# Patient Record
Sex: FEMALE | Race: WHITE
[De-identification: names, ages, dates, MRNs, and addresses within clinical notes are randomized per-mention and may not be internally consistent; named-entity substitution may affect disease eponyms.]

---

## 2020-03-19 ENCOUNTER — HOSPITAL ENCOUNTER (EMERGENCY)
Dept: HOSPITAL 75 - ER FS | Age: 20
Discharge: HOME | End: 2020-03-19
Payer: COMMERCIAL

## 2020-03-19 VITALS — BODY MASS INDEX: 27.62 KG/M2 | WEIGHT: 165.79 LBS | HEIGHT: 65 IN

## 2020-03-19 VITALS — SYSTOLIC BLOOD PRESSURE: 125 MMHG | DIASTOLIC BLOOD PRESSURE: 74 MMHG

## 2020-03-19 DIAGNOSIS — A08.4: Primary | ICD-10-CM

## 2020-03-19 LAB
ALBUMIN SERPL-MCNC: 4.4 GM/DL (ref 3.2–4.5)
ALP SERPL-CCNC: 98 U/L (ref 40–136)
ALT SERPL-CCNC: 14 U/L (ref 0–55)
APTT PPP: YELLOW S
BASOPHILS # BLD AUTO: 0.1 10^3/UL (ref 0–0.1)
BASOPHILS NFR BLD AUTO: 0 % (ref 0–10)
BASOPHILS NFR BLD MANUAL: 0 %
BILIRUB SERPL-MCNC: 1 MG/DL (ref 0.1–1)
BILIRUB UR QL STRIP: NEGATIVE
BUN/CREAT SERPL: 18
CALCIUM SERPL-MCNC: 9.2 MG/DL (ref 8.5–10.1)
CHLORIDE SERPL-SCNC: 103 MMOL/L (ref 98–107)
CO2 SERPL-SCNC: 18 MMOL/L (ref 21–32)
CREAT SERPL-MCNC: 0.66 MG/DL (ref 0.6–1.3)
EOSINOPHIL # BLD AUTO: 0 10^3/UL (ref 0–0.3)
EOSINOPHIL NFR BLD AUTO: 0 % (ref 0–10)
EOSINOPHIL NFR BLD MANUAL: 0 %
ERYTHROCYTE [DISTWIDTH] IN BLOOD BY AUTOMATED COUNT: 12.9 % (ref 10–14.5)
FIBRINOGEN PPP-MCNC: (no result) MG/DL
GFR SERPLBLD BASED ON 1.73 SQ M-ARVRAT: > 60 ML/MIN
GLUCOSE SERPL-MCNC: 124 MG/DL (ref 70–105)
GLUCOSE UR STRIP-MCNC: NEGATIVE MG/DL
HCT VFR BLD CALC: 47 % (ref 35–52)
HGB BLD-MCNC: 15.8 G/DL (ref 11.5–16)
KETONES UR QL STRIP: (no result)
LEUKOCYTE ESTERASE UR QL STRIP: (no result)
LIPASE SERPL-CCNC: 14 U/L (ref 8–78)
LYMPHOCYTES # BLD AUTO: 0.6 X 10^3 (ref 1–4)
LYMPHOCYTES NFR BLD AUTO: 4 % (ref 12–44)
MANUAL DIFFERENTIAL PERFORMED BLD QL: YES
MCH RBC QN AUTO: 29 PG (ref 25–34)
MCHC RBC AUTO-ENTMCNC: 34 G/DL (ref 32–36)
MCV RBC AUTO: 85 FL (ref 80–99)
METAMYELOCYTES NFR BLD: 1 %
MONOCYTES # BLD AUTO: 1 X 10^3 (ref 0–1)
MONOCYTES NFR BLD AUTO: 6 % (ref 0–12)
MONOCYTES NFR BLD: 3 %
MYELOCYTES NFR BLD: 1 %
NEUTROPHILS # BLD AUTO: 15.3 X 10^3 (ref 1.8–7.8)
NEUTROPHILS NFR BLD AUTO: 90 % (ref 42–75)
NEUTS BAND NFR BLD MANUAL: 78 %
NEUTS BAND NFR BLD: 11 %
NITRITE UR QL STRIP: NEGATIVE
PH UR STRIP: 5.5 [PH] (ref 5–9)
PLATELET # BLD: 305 10^3/UL (ref 130–400)
PMV BLD AUTO: 10.2 FL (ref 7.4–10.4)
POTASSIUM SERPL-SCNC: 4.3 MMOL/L (ref 3.6–5)
PROT SERPL-MCNC: 7.7 GM/DL (ref 6.4–8.2)
PROT UR QL STRIP: NEGATIVE
RBC #/AREA URNS HPF: (no result) /HPF
SODIUM SERPL-SCNC: 137 MMOL/L (ref 135–145)
SP GR UR STRIP: >=1.03 (ref 1.02–1.02)
SQUAMOUS #/AREA URNS HPF: (no result) /HPF
VARIANT LYMPHS NFR BLD MANUAL: 6 %
WBC # BLD AUTO: 17 10^3/UL (ref 4.3–11)
WBC #/AREA URNS HPF: (no result) /HPF

## 2020-03-19 PROCEDURE — 36415 COLL VENOUS BLD VENIPUNCTURE: CPT

## 2020-03-19 PROCEDURE — 87088 URINE BACTERIA CULTURE: CPT

## 2020-03-19 PROCEDURE — 83690 ASSAY OF LIPASE: CPT

## 2020-03-19 PROCEDURE — 84703 CHORIONIC GONADOTROPIN ASSAY: CPT

## 2020-03-19 PROCEDURE — 85007 BL SMEAR W/DIFF WBC COUNT: CPT

## 2020-03-19 PROCEDURE — 80053 COMPREHEN METABOLIC PANEL: CPT

## 2020-03-19 PROCEDURE — 85027 COMPLETE CBC AUTOMATED: CPT

## 2020-03-19 PROCEDURE — 81000 URINALYSIS NONAUTO W/SCOPE: CPT

## 2020-03-19 PROCEDURE — 74022 RADEX COMPL AQT ABD SERIES: CPT

## 2020-03-19 NOTE — ED GU-FEMALE
General


Chief Complaint:  Abdominal/GI Problems


Stated Complaint:  BACK PAIN,VOMITING


Nursing Triage Note:  


pt states back pain with n/v/d started around 1930 tonight, pt has not taken 


anything for symptoms


Nursing Sepsis Screen:  No Definite Risk


Source:  patient


Exam Limitations:  no limitations





History of Present Illness


Date Seen by Provider:  Mar 19, 2020


Time Seen by Provider:  04:35


Initial Comments


19-year-old female presents with nausea, vomiting, diarrhea started around 7:30 

PM last night. Patient also complains of diffuse abdomen pain but worse in the 

epigastric and lower part of her abdomen. With severe pain in her back. She does

not have any cough or fever. He does not have any dysuria. She does not believe 

she is pregnant because she has an IUD.





Allergies and Home Medications


Allergies


Coded Allergies:  


     No Known Drug Allergies (Unverified , 3/19/20)





Patient Home Medication List


Home Medication List Reviewed:  Yes





Review of Systems


Review of Systems


Constitutional:  chills, fever


Respiratory:  No cough, No short of breath


Cardiovascular:  No chest pain


Gastrointestinal:  abdominal pain, diarrhea, nausea, vomiting


Genitourinary:  no symptoms reported


Musculoskeletal:  back pain


Skin:  no symptoms reported


Psychiatric/Neurological:  No Symptoms Reported


Endocrine:  No Symptoms Reported





Past Medical-Social-Family Hx


Past Med/Social Hx:  Reviewed Nursing Past Med/Soc Hx


Patient Social History


Alcohol Use:  Denies Use


Recreational Drug Use:  No


Smoking Status:  Never a Smoker


2nd Hand Smoke Exposure:  No


Recent Foreign Travel:  No


Contact w/Someone Who Travel:  No


Recent Infectious Disease Expo:  No


Recent Hopitalizations:  No


Physical Abuse:  No


Sexual Abuse:  No


Mistreated:  No


Fear:  No





Seasonal Allergies


Seasonal Allergies:  No





Past Medical History


Surgeries:  No


Respiratory:  No


Cardiac:  No


Neurological:  No


Genitourinary:  No


Gastrointestinal:  No


Musculoskeletal:  No


Endocrine:  No


HEENT:  No


Cancer:  No


Psychosocial:  No


Integumentary:  No


Blood Disorders:  No





Physical Exam


Vital Signs





Vital Signs - First Documented








 3/19/20





 04:29


 


Temp 36.6


 


Pulse 117


 


Resp 18


 


B/P (MAP) 117/71 (86)


 


Pulse Ox 100


 


O2 Delivery Room Air





Capillary Refill : Less Than 3 Seconds


Height, Weight, BMI


Height: '"


Weight: lbs. oz. kg; 27.00 BMI


Method:


General Appearance:  mild distress


Cardiovascular:  regular rate, rhythm


Respiratory:  lungs clear, normal breath sounds


Gastrointestinal:  No guarding, No rebound; tenderness (diffuse but slightly 

worse upper and lower)


Back:  other (mid back pain)


Extremities:  normal range of motion, non-tender


Neurologic/Psychiatric:  CNs II-XII nml as tested, alert, normal mood/affect, 

oriented x 3


Skin:  normal color, warm/dry





Progress/Results/Core Measures


Suspected Sepsis


Recent Fever Within 48 Hours:  No


Infection Criteria Present:  None


New/Unexplained  Altered Menta:  No


Sepsis Screen:  No Definite Risk


SIRS


Temperature: 


Pulse: 117 


Respiratory Rate: 18


 


Laboratory Tests


3/19/20 04:50: White Blood Count 17.0H


Blood Pressure 117 /71 


Mean: 86


 





Laboratory Tests


3/19/20 04:50: 


Creatinine 0.66, Platelet Count 305, Total Bilirubin 1.0








Results/Orders


Lab Results





Laboratory Tests








Test


 3/19/20


04:30 3/19/20


04:50 Range/Units


 


 


Urine Color YELLOW    


 


Urine Clarity CLOUDY    


 


Urine pH 5.5   5-9  


 


Urine Specific Gravity >=1.030   1.016-1.022  


 


Urine Protein NEGATIVE   NEGATIVE  


 


Urine Glucose (UA) NEGATIVE   NEGATIVE  


 


Urine Ketones 1+ H  NEGATIVE  


 


Urine Nitrite NEGATIVE   NEGATIVE  


 


Urine Bilirubin NEGATIVE   NEGATIVE  


 


Urine Urobilinogen 0.2   < = 1.0  MG/DL


 


Urine Leukocyte Esterase TRACE H  NEGATIVE  


 


Urine RBC (Auto) TRACE H  NEGATIVE  


 


Urine RBC NONE    /HPF


 


Urine WBC RARE    /HPF


 


Urine Squamous Epithelial


Cells RARE 


 


  /HPF





 


Urine Crystals NONE    /LPF


 


Urine Bacteria NA    /HPF


 


Urine Casts NONE    /LPF


 


Urine Mucus NONE    /LPF


 


Urine Culture Indicated YES    


 


White Blood Count


 


 17.0 H


 4.3-11.0


10^3/uL


 


Red Blood Count


 


 5.49 


 4.35-5.85


10^6/uL


 


Hemoglobin  15.8  11.5-16.0  G/DL


 


Hematocrit  47  35-52  %


 


Mean Corpuscular Volume  85  80-99  FL


 


Mean Corpuscular Hemoglobin  29  25-34  PG


 


Mean Corpuscular Hemoglobin


Concent 


 34 


 32-36  G/DL





 


Red Cell Distribution Width  12.9  10.0-14.5  %


 


Platelet Count


 


 305 


 130-400


10^3/uL


 


Mean Platelet Volume  10.2  7.4-10.4  FL


 


Neutrophils (%) (Auto)  90 H 42-75  %


 


Lymphocytes (%) (Auto)  4 L 12-44  %


 


Monocytes (%) (Auto)  6  0-12  %


 


Eosinophils (%) (Auto)  0  0-10  %


 


Basophils (%) (Auto)  0  0-10  %


 


Neutrophils # (Auto)  15.3 H 1.8-7.8  X 10^3


 


Lymphocytes # (Auto)  0.6 L 1.0-4.0  X 10^3


 


Monocytes # (Auto)  1.0  0.0-1.0  X 10^3


 


Eosinophils # (Auto)


 


 0.0 


 0.0-0.3


10^3/uL


 


Basophils # (Auto)


 


 0.1 


 0.0-0.1


10^3/uL


 


Neutrophils % (Manual)  78   %


 


Lymphocytes % (Manual)  6   %


 


Monocytes % (Manual)  3   %


 


Eosinophils % (Manual)  0   %


 


Basophils % (Manual)  0   %


 


Metamyelocytes %  1   %


 


Myelocytes %  1   %


 


Band Neutrophils  11   %


 


Sodium Level  137  135-145  MMOL/L


 


Potassium Level  4.3  3.6-5.0  MMOL/L


 


Chloride Level  103    MMOL/L


 


Carbon Dioxide Level  18 L 21-32  MMOL/L


 


Anion Gap  16 H 5-14  MMOL/L


 


Blood Urea Nitrogen  12  7-18  MG/DL


 


Creatinine


 


 0.66 


 0.60-1.30


MG/DL


 


Estimat Glomerular Filtration


Rate 


 > 60 


  





 


BUN/Creatinine Ratio  18   


 


Glucose Level  124 H   MG/DL


 


Calcium Level  9.2  8.5-10.1  MG/DL


 


Corrected Calcium  8.9  8.5-10.1  MG/DL


 


Total Bilirubin  1.0  0.1-1.0  MG/DL


 


Aspartate Amino Transf


(AST/SGOT) 


 15 


 5-34  U/L





 


Alanine Aminotransferase


(ALT/SGPT) 


 14 


 0-55  U/L





 


Alkaline Phosphatase  98    U/L


 


Total Protein  7.7  6.4-8.2  GM/DL


 


Albumin  4.4  3.2-4.5  GM/DL


 


Lipase  14  8-78  U/L


 


Serum Pregnancy Test,


Qualitative 


 NEGATIVE 


 NEGATIVE  











My Orders





Orders - SCHAEFFER,ESVIN L DO


Cbc With Automated Diff (3/19/20 04:40)


Comprehensive Metabolic Panel (3/19/20 04:40)


Hcg,Qualitative Serum (3/19/20 04:40)


Lipase (3/19/20 04:40)


Ua Culture If Indicated (3/19/20 04:40)


Ed Iv/Invasive Line Start (3/19/20 04:40)


Ondansetron Injection (Zofran Injectio (3/19/20 04:45)


Ns Iv 1000 Ml (Sodium Chloride 0.9%) (3/19/20 04:40)


Ed Iv/Invasive Line Start (3/19/20 04:40)


Acute Abd Series (3/19/20 04:40)


Ketorolac Injection (Toradol Injection) (3/19/20 04:40)


Manual Differential (3/19/20 04:50)


Urine Culture (3/19/20 04:30)





Medications Given in ED





Current Medications








 Medications  Dose


 Ordered  Sig/Gamaliel


 Route  Start Time


 Stop Time Status Last Admin


Dose Admin


 


 Ondansetron HCl  4 mg  ONCE  ONCE


 IVP  3/19/20 04:45


 3/19/20 04:46 DC 3/19/20 04:54


4 MG








Vital Signs/I&O











 3/19/20





 04:29


 


Temp 36.6


 


Pulse 117


 


Resp 18


 


B/P (MAP) 117/71 (86)


 


Pulse Ox 100


 


O2 Delivery Room Air





Capillary Refill : Less Than 3 Seconds








Blood Pressure Mean:                    86








Progress Note :  


   Time:  05:48


Progress Note


Patient's symptoms improved following IV fluids and Zofran. Have elevated white 

count consistent with vomiting and likely some mild dehydration. Patient with an

illness that is consistent with a viral gastroenteritis. She will be discharged 

home with a prescription for Zofran. She should return to the ER as needed. She 

should not return to work until 24 hours after the vomiting and diarrhea 

resolves.





Diagnostic Imaging





   Diagonstic Imaging:  Xray


   Plain Films/CT/US/NM/MRI:  chest, abdomen


Comments


No acute findings


   Reviewed:  Reviewed by Me





Departure


Impression





   Primary Impression:  


   Viral gastroenteritis


Disposition:  01 HOME, SELF-CARE


Condition:  Stable





Departure-Patient Inst.


Referrals:  


NO,LOCAL PHYSICIAN (PCP/Family)


Primary Care Physician


Patient Instructions:  Viral Gastroenteritis





Add. Discharge Instructions:  





Emergency department focuses on treating and ruling out life-threatening disea

ses. Whenever possible, a diagnosis is given. However, most patients are given 

an impression based on their  history, physical exam, and workup during your 

brief time in the ER. Information about probable diagnosis and other educational

material has been provided. Please take the time to read and understand this 

information.





It is very important that you follow up with a physician as discussed during the

visit today. Failure to adhere to your follow-up instructions may lead to severe

disability, injury, or death so please make sure to keep your appointments or 

obtain  one as requested. Please keep in mind the emergency department is not 

designed to your primary care or "family doctor" and nonurgent issues are best 

evaluated by an outpatient physician





All discharge instructions reviewed with patient and/or family. Voiced 

understanding.


Scripts


Ondansetron (Ondansetron Odt) 4 Mg Tab.rapdis


4 MG PO Q6H PRN for NAUSEA/VOMITING, #20 TAB 0 Refills


   Prov: ESVIN SCHAEFFER DO         3/19/20


Work/School Note:  Work Release Form   Date Seen in the Emergency Department:  

Mar 19, 2020


   Return to Work:  Mar 22, 2020











ESVIN SCHAEFFER DO               Mar 19, 2020 04:39

## 2020-03-19 NOTE — DIAGNOSTIC IMAGING REPORT
INDICATION: Nausea, emesis and diarrhea



Supine and upright views of the abdomen are obtained with single

view of the chest. The lungs are clear, bilaterally. Bowel gas

pattern is unremarkable. There is no evidence of free

intraperitoneal gas or pneumatosis. Pelvic intrauterine device is

noted.



IMPRESSION: No acute abnormality.



Dictated by: 



  Dictated on workstation # [id number]

## 2022-06-30 ENCOUNTER — HOSPITAL ENCOUNTER (EMERGENCY)
Dept: HOSPITAL 75 - ER FS | Age: 22
Discharge: HOME | End: 2022-06-30
Payer: COMMERCIAL

## 2022-06-30 VITALS — WEIGHT: 237.88 LBS | BODY MASS INDEX: 39.63 KG/M2 | HEIGHT: 65 IN

## 2022-06-30 VITALS — SYSTOLIC BLOOD PRESSURE: 118 MMHG | DIASTOLIC BLOOD PRESSURE: 74 MMHG

## 2022-06-30 DIAGNOSIS — Z97.5: ICD-10-CM

## 2022-06-30 DIAGNOSIS — K29.70: Primary | ICD-10-CM

## 2022-06-30 DIAGNOSIS — Z28.310: ICD-10-CM

## 2022-06-30 LAB
ALBUMIN SERPL-MCNC: 4.1 GM/DL (ref 3.2–4.5)
ALP SERPL-CCNC: 85 U/L (ref 40–136)
ALT SERPL-CCNC: 32 U/L (ref 0–55)
APTT PPP: YELLOW S
BACTERIA #/AREA URNS HPF: (no result) /HPF
BASOPHILS # BLD AUTO: 0.1 10^3/UL (ref 0–0.1)
BASOPHILS NFR BLD AUTO: 1 % (ref 0–10)
BILIRUB SERPL-MCNC: 0.5 MG/DL (ref 0.1–1)
BILIRUB UR QL STRIP: NEGATIVE
BUN/CREAT SERPL: 14
CALCIUM SERPL-MCNC: 9.2 MG/DL (ref 8.5–10.1)
CHLORIDE SERPL-SCNC: 108 MMOL/L (ref 98–107)
CO2 SERPL-SCNC: 21 MMOL/L (ref 21–32)
CREAT SERPL-MCNC: 0.71 MG/DL (ref 0.6–1.3)
EOSINOPHIL # BLD AUTO: 0.2 10^3/UL (ref 0–0.3)
EOSINOPHIL NFR BLD AUTO: 2 % (ref 0–10)
FIBRINOGEN PPP-MCNC: (no result) MG/DL
GFR SERPLBLD BASED ON 1.73 SQ M-ARVRAT: 122 ML/MIN
GLUCOSE SERPL-MCNC: 122 MG/DL (ref 70–105)
GLUCOSE UR STRIP-MCNC: NEGATIVE MG/DL
HCT VFR BLD CALC: 43 % (ref 35–52)
HGB BLD-MCNC: 14.6 G/DL (ref 11.5–16)
KETONES UR QL STRIP: NEGATIVE
LEUKOCYTE ESTERASE UR QL STRIP: (no result)
LIPASE SERPL-CCNC: 23 U/L (ref 8–78)
LYMPHOCYTES # BLD AUTO: 2.8 10^3/UL (ref 1–4)
LYMPHOCYTES NFR BLD AUTO: 31 % (ref 12–44)
MAGNESIUM SERPL-MCNC: 1.9 MG/DL (ref 1.6–2.4)
MANUAL DIFFERENTIAL PERFORMED BLD QL: NO
MCH RBC QN AUTO: 29 PG (ref 25–34)
MCHC RBC AUTO-ENTMCNC: 34 G/DL (ref 32–36)
MCV RBC AUTO: 85 FL (ref 80–99)
MONOCYTES # BLD AUTO: 0.5 10^3/UL (ref 0–1)
MONOCYTES NFR BLD AUTO: 6 % (ref 0–12)
NEUTROPHILS # BLD AUTO: 5.3 10^3/UL (ref 1.8–7.8)
NEUTROPHILS NFR BLD AUTO: 60 % (ref 42–75)
NITRITE UR QL STRIP: NEGATIVE
PH UR STRIP: 6 [PH] (ref 5–9)
PLATELET # BLD: 287 10^3/UL (ref 130–400)
PMV BLD AUTO: 10.2 FL (ref 9–12.2)
POTASSIUM SERPL-SCNC: 4.2 MMOL/L (ref 3.6–5)
PROT SERPL-MCNC: 7.2 GM/DL (ref 6.4–8.2)
PROT UR QL STRIP: NEGATIVE
RBC #/AREA URNS HPF: (no result) /HPF
SODIUM SERPL-SCNC: 140 MMOL/L (ref 135–145)
SP GR UR STRIP: 1.02 (ref 1.02–1.02)
SQUAMOUS #/AREA URNS HPF: (no result) /HPF
WBC # BLD AUTO: 8.9 10^3/UL (ref 4.3–11)
WBC #/AREA URNS HPF: (no result) /HPF

## 2022-06-30 PROCEDURE — 84484 ASSAY OF TROPONIN QUANT: CPT

## 2022-06-30 PROCEDURE — 85025 COMPLETE CBC W/AUTO DIFF WBC: CPT

## 2022-06-30 PROCEDURE — 74022 RADEX COMPL AQT ABD SERIES: CPT

## 2022-06-30 PROCEDURE — 83735 ASSAY OF MAGNESIUM: CPT

## 2022-06-30 PROCEDURE — 84145 PROCALCITONIN (PCT): CPT

## 2022-06-30 PROCEDURE — 80053 COMPREHEN METABOLIC PANEL: CPT

## 2022-06-30 PROCEDURE — 86141 C-REACTIVE PROTEIN HS: CPT

## 2022-06-30 PROCEDURE — 36415 COLL VENOUS BLD VENIPUNCTURE: CPT

## 2022-06-30 PROCEDURE — 93005 ELECTROCARDIOGRAM TRACING: CPT

## 2022-06-30 PROCEDURE — 87591 N.GONORRHOEAE DNA AMP PROB: CPT

## 2022-06-30 PROCEDURE — 81000 URINALYSIS NONAUTO W/SCOPE: CPT

## 2022-06-30 PROCEDURE — 83690 ASSAY OF LIPASE: CPT

## 2022-06-30 NOTE — ED GENERAL
History of Present Illness


Date Seen by Provider:  Jun 30, 2022


Time Seen by Provider:  12:59


Initial Comments


22-year-old female presents with diffuse abdominal pain along with some pain 

that goes up into her chest.  She reports is been going on for at least 5 days. 

She was seen 3 days ago at urgent care where they were concerned about PID and 

started her on 2 antibiotics.  Patient has a history of chlamydia that was 

cleared 6 months ago patient has some nausea but no vomiting.  She denies any 

fever, chills.  She denies cough.  She has no abnormal vaginal drainage.  She 

does have some occasional bleeding has been going on for a while and currently 

has an IUD.





Allergies and Home Medications


Allergies


Coded Allergies:  


     No Known Drug Allergies (Unverified , 3/19/20)





Patient Home Medication List


Home Medication List Reviewed:  Yes


Dicyclomine HCl (Dicyclomine HCl) 20 Mg Tablet, 20 MG PO QID


   Prescribed by: ESVIN SCHAEFFER on 6/30/22 1540


Ondansetron (Ondansetron Odt) 4 Mg Tab.rapdis, 4 MG PO Q6H PRN for 

NAUSEA/VOMITING


   Prescribed by: ESVIN SCHAEFFER on 3/19/20 0550





Review of Systems


Review of Systems


Constitutional:  No chills, No fever


Respiratory:  No cough, No short of breath


Cardiovascular:  chest pain


Gastrointestinal:  abdominal pain; No constipation, No diarrhea; nausea; No 

vomiting


Genitourinary:  see HPI; No dysuria


Pregnant:  No


Musculoskeletal:  no symptoms reported


Skin:  no symptoms reported


Psychiatric/Neurological:  No Symptoms Reported


Hematologic/Lymphatic:  No Symptoms Reported





Past Medical-Social-Family Hx


Seasonal Allergies


Seasonal Allergies:  No





Past Medical History


Surgeries:  No


Respiratory:  No


Cardiac:  No


Neurological:  No


Genitourinary:  No


Gastrointestinal:  No


Musculoskeletal:  No


Endocrine:  No


HEENT:  No


Cancer:  No


Psychosocial:  No


Integumentary:  No


Blood Disorders:  No





Physical Exam


Vital Signs





Vital Signs - First Documented








 6/30/22





 12:56


 


Temp 36.4


 


Pulse 69


 


Resp 16


 


B/P (MAP) 136/73 (94)


 


Pulse Ox 96


 


O2 Delivery Room Air





Capillary Refill :


Height, Weight, BMI


Height: '"


Weight: lbs. oz. kg; 27.00 BMI


Method:


General Appearance:  No Apparent Distress, WD/WN


Neck:  Normal Inspection, Non Tender


Respiratory:  Lungs Clear, Normal Breath Sounds


Cardiovascular:  Regular Rate, Rhythm, No Edema


Gastrointestinal:  Soft, Tenderness (Mainly mid abdomen but diffuse)


Back:  Normal Inspection


Extremity:  Normal Capillary Refill, Normal Inspection, Normal Range of Motion


Neurologic/Psychiatric:  Alert, Oriented x3, No Motor/Sensory Deficits, Normal 

Mood/Affect, CNs II-XII Norm as Tested


Skin:  Normal Color, Warm/Dry





Progress/Results/Core Measures


Suspected Sepsis


SIRS


Temperature: 


Pulse:  


Respiratory Rate: 


 


Laboratory Tests


6/30/22 13:05: White Blood Count 8.9


Blood Pressure  / 


Mean: 


 





Laboratory Tests


6/30/22 13:05: 


Creatinine 0.71, Platelet Count 287, Total Bilirubin 0.5








Results/Orders


Lab Results





Laboratory Tests








Test


 6/30/22


13:05 6/30/22


13:32 Range/Units


 


 


White Blood Count


 8.9 


 


 4.3-11.0


10^3/uL


 


Red Blood Count


 5.01 


 


 3.80-5.11


10^6/uL


 


Hemoglobin 14.6   11.5-16.0  g/dL


 


Hematocrit 43   35-52  %


 


Mean Corpuscular Volume 85   80-99  fL


 


Mean Corpuscular Hemoglobin 29   25-34  pg


 


Mean Corpuscular Hemoglobin


Concent 34 


 


 32-36  g/dL





 


Red Cell Distribution Width 12.6   10.0-14.5  %


 


Platelet Count


 287 


 


 130-400


10^3/uL


 


Mean Platelet Volume 10.2   9.0-12.2  fL


 


Immature Granulocyte % (Auto) 0    %


 


Neutrophils (%) (Auto) 60   42-75  %


 


Lymphocytes (%) (Auto) 31   12-44  %


 


Monocytes (%) (Auto) 6   0-12  %


 


Eosinophils (%) (Auto) 2   0-10  %


 


Basophils (%) (Auto) 1   0-10  %


 


Neutrophils # (Auto)


 5.3 


 


 1.8-7.8


10^3/uL


 


Lymphocytes # (Auto)


 2.8 


 


 1.0-4.0


10^3/uL


 


Monocytes # (Auto)


 0.5 


 


 0.0-1.0


10^3/uL


 


Eosinophils # (Auto)


 0.2 


 


 0.0-0.3


10^3/uL


 


Basophils # (Auto)


 0.1 


 


 0.0-0.1


10^3/uL


 


Immature Granulocyte # (Auto)


 0.0 


 


 0.0-0.1


10^3/uL


 


Sodium Level 140   135-145  MMOL/L


 


Potassium Level 4.2   3.6-5.0  MMOL/L


 


Chloride Level 108 H    MMOL/L


 


Carbon Dioxide Level 21   21-32  MMOL/L


 


Anion Gap 11   5-14  MMOL/L


 


Blood Urea Nitrogen 10   7-18  MG/DL


 


Creatinine


 0.71 


 


 0.60-1.30


MG/DL


 


Estimat Glomerular Filtration


Rate 122 


 


  





 


BUN/Creatinine Ratio 14    


 


Glucose Level 122 H    MG/DL


 


Calcium Level 9.2   8.5-10.1  MG/DL


 


Corrected Calcium 9.1   8.5-10.1  MG/DL


 


Magnesium Level 1.9   1.6-2.4  MG/DL


 


Total Bilirubin 0.5   0.1-1.0  MG/DL


 


Aspartate Amino Transf


(AST/SGOT) 22 


 


 5-34  U/L





 


Alanine Aminotransferase


(ALT/SGPT) 32 


 


 0-55  U/L





 


Alkaline Phosphatase 85     U/L


 


Troponin I < 0.30   <0.30  NG/ML


 


C-Reactive Protein < 0.30   <0.50  MG/DL


 


Total Protein 7.2   6.4-8.2  GM/DL


 


Albumin 4.1   3.2-4.5  GM/DL


 


Lipase 23   8-78  U/L


 


Urine Color  YELLOW   


 


Urine Clarity  CLOUDY   


 


Urine pH  6.0  5-9  


 


Urine Specific Gravity  1.025 H 1.016-1.022  


 


Urine Protein  NEGATIVE  NEGATIVE  


 


Urine Glucose (UA)  NEGATIVE  NEGATIVE  


 


Urine Ketones  NEGATIVE  NEGATIVE  


 


Urine Nitrite  NEGATIVE  NEGATIVE  


 


Urine Bilirubin  NEGATIVE  NEGATIVE  


 


Urine Urobilinogen  0.2  < = 1.0  MG/DL


 


Urine Leukocyte Esterase  1+ H NEGATIVE  


 


Urine RBC (Auto)  NEGATIVE  NEGATIVE  


 


Urine RBC  NONE   /HPF


 


Urine WBC  2-5   /HPF


 


Urine Squamous Epithelial


Cells 


 2-5 


  /HPF





 


Urine Crystals  NONE   /LPF


 


Urine Bacteria  TRACE   /HPF


 


Urine Casts  NONE   /LPF


 


Urine Mucus  NEGATIVE   /LPF


 


Urine Culture Indicated  NO   








My Orders





Orders - SCHAEFFER,ESVIN L DO


Cbc With Automated Diff (6/30/22 13:02)


Comprehensive Metabolic Panel (6/30/22 13:02)


Lipase (6/30/22 13:02)


Magnesium (6/30/22 13:02)


Procalcitonin (Pct) (6/30/22 13:02)


Ua Culture If Indicated (6/30/22 13:02)


Crp Fs (6/30/22 13:02)


Troponin I Fs (6/30/22 13:02)


Neis Thierry Dna Urine Test (6/30/22 13:02)


Ondansetron Injection (Zofran Injectio (6/30/22 13:15)


Ed Iv/Invasive Line Start (6/30/22 13:02)


Acute Abd Series (6/30/22 13:03)


Lidocaine 2% Viscous 15 Ml (Xylocaine Vi (6/30/22 14:00)


Antacid  Suspension (Mylanta  Suspension (6/30/22 14:00)


Hyoscyamine Sl Tablet (Levsin Sl Tablet) (6/30/22 14:45)


Dicyclomine Injection (Bentyl Injection) (6/30/22 14:45)





Medications Given in ED





Current Medications








 Medications  Dose


 Ordered  Sig/Gamaliel


 Route  Start Time


 Stop Time Status Last Admin


Dose Admin


 


 Al Hydrox/Mg


 Hydrox/Simethicone  30 ml  ONCE  ONCE


 PO  6/30/22 14:00


 6/30/22 14:01 DC 6/30/22 14:03


30 ML


 


 Dicyclomine HCl  20 mg  ONCE  ONCE


 IM  6/30/22 14:45


 6/30/22 14:46 DC 6/30/22 14:58


20 MG


 


 Hyoscyamine


 Sulfate  0.125 mg  ONCE  ONCE


 PO  6/30/22 14:45


 6/30/22 14:46 DC 6/30/22 14:58


0.125 MG


 


 Lidocaine HCl  15 ml  ONCE  ONCE


 PO  6/30/22 14:00


 6/30/22 14:01 DC 6/30/22 14:03


15 ML


 


 Ondansetron HCl  4 mg  ONCE  ONCE


 IVP  6/30/22 13:15


 6/30/22 13:16 DC 6/30/22 13:27


4 MG








Vital Signs/I&O











 6/30/22





 12:56


 


Temp 36.4


 


Pulse 69


 


Resp 16


 


B/P (MAP) 136/73 (94)


 


Pulse Ox 96


 


O2 Delivery Room Air





Capillary Refill :


Progress Note :  


Progress Note


Patient's pain/pressure is mainly located in the epigastric region.  Patient 

with no acute lab findings or x-ray findings or significant exam findings.  Her 

symptoms are likely gastritis/reflux.  Recommended to her that she start Pepcid 

twice daily and Protonix.  That her symptoms will take a little while to 

significantly improve and go to 0.  I will also give her some Bentyl to help 

with the cramping.  Recommend if she is not better in 7 to 10 days she follows 

up with her primary care provider for recheck.  Patient stable and discharged 

home





ECG


Initial ECG Impression Date:  Jun 30, 2022


Initial ECG Impression Time:  12:57





Departure


Impression





   Primary Impression:  


   Abdominal discomfort, epigastric


   Additional Impression:  


   Gastritis


   Qualified Codes:  K29.70 - Gastritis, unspecified, without bleeding


Disposition:  01 HOME, SELF-CARE


Condition:  Stable





Departure-Patient Inst.


Referrals:  


NO,LOCAL PHYSICIAN (PCP/Family)


Primary Care Physician


Patient Instructions:  Gastritis ED, Acid Reflux and GERD in Adults (DC), 

Stomach Ache and Stomach Upset





Add. Discharge Instructions:  


Please start Pepcid 20 mg twice daily and Protonix 20 mg daily


Follow-up with your primary care provider if symptoms have not seemingly improve

d in 7 to 10 days


Scripts


Dicyclomine HCl (Dicyclomine HCl) 20 Mg Tablet


20 MG PO QID, #20 TAB


   Prov: ESVIN SCHAEFFER DO         6/30/22











ESVIN SCHAEFFER DO               Jun 30, 2022 13:01

## 2022-06-30 NOTE — DIAGNOSTIC IMAGING REPORT
INDICATION: Epigastric pain radiating into the chest.



FINDINGS: Lungs are clear. No failure, effusion, or pneumothorax.

The bowel gas pattern is normal. There is some stool in the

colon, but the fecal load is not grossly pathologic. There is an

IUD device in the midline pelvis. No suspicious calcifications.



IMPRESSION: No acute finding at the acute abdominal series.



Dictated by: 



  Dictated on workstation # [id number]